# Patient Record
(demographics unavailable — no encounter records)

---

## 2025-05-14 NOTE — HISTORY OF PRESENT ILLNESS
[Stable] : stable [___ mths] : [unfilled] month(s) ago [1] : currently ~his/her~ pain is 1 out of 10 [Intermit.] : ~He/She~ states the symptoms seem to be intermittent [Running] : worsened by running [Acetaminophen] : relieved by acetaminophen [Recumbency] : relieved by recumbency [Rest] : relieved by rest [FreeTextEntry1] : Patient is a 14 yo F with no significant pmhx who is presenting today for a few months of bilateral anterior knee pain. She is very active and does gymnastics and swimming. After gymnastics practice she notes that she has a few minutes of anterior knee pain. The knee pain usually resolves by itself or gets better with Tylenol. It is usually mild in nature and only happens "once in a while" when she is very active. Has not prevented her from doing gymnastics or other physical activity. Denies changes in gait. Denies numbness or tingling. Denies fever, chills, SOB.  The HPI was reviewed with patient and parent. The patient's parent has acted as an independent historian regarding the above information due to unreliable nature of the history obtained from the patient.

## 2025-05-14 NOTE — PHYSICAL EXAM
[FreeTextEntry1] : Patient is AAOx3. Pleasant and cooperative with exam, appropriate for age. NAD and alert.  Skin: The skin is intact, warm, pink and dry over the area examined. No rashes, lesions, or nodules. Eyes: Normal conjunctiva, normal eyelids and pupils were equal and round.  ENT: Normal ears, normal nose, and normal lips. Cardiovascular: Brisk capillary refill. Peripheral pulses intact. No peripheral edema. Respiratory: NAD. Taking full breaths without use of accessory muscles or evidence of audible wheezes or stridor without the use of a stethoscope. Normal respiratory effort.  Abdomen: Not examined.  Lower extremity exam: No gross deformity No point tenderness or pain to palpation FROM of knees with no pain on ROM  Negative anterior and posterior drawer. No pain on varus and valgus stress.  Hip ROM full and symmetric 5/5 motor strength with knee and hip flexion/extension Moving all digits freely EHL/FHL/TA/GS intact BCR in all digits +2 DP pulse Sensation grossly intact No lymphedema No evidence of LLD No evidence of malrotation

## 2025-05-14 NOTE — BIRTH HISTORY
[Non-Contributory] : Non-contributory [Duration: ___ wks] : duration: [unfilled] weeks [Vaginal] : Vaginal [Normal?] : normal delivery [___ lbs.] : [unfilled] lbs [___ oz.] : [unfilled] oz. [Was child in NICU?] : Child was in NICU [FreeTextEntry7] : branchial cyst surgery

## 2025-05-14 NOTE — ASSESSMENT
[FreeTextEntry1] : Mild patellar tendinitis, bilateral  The history for today's visit was obtained from the child as well as the parent. The child's history was unreliable alone due to age and therefore, the parent was used today as an independent historian. No imaging was obtained today.  Clinically, the patient notes that the tenderness is located at the inferior border of the patella over the patellar tendon. She denies any pain today and has no pain on exam. The pain occurs for a few minutes after long practices or days she is on her knees for extended periods of time in gymnastics class. States that her pain has not kept her from any activity. Tylenol and ice usually help with her pain. She likely has mild patellar tendinitis due to her activity. Diagnosis was discussed at length with the patient and family. The etiology, pathoanatomy, treatment modalities, and expected natural history of the injury were discussed at length today. Recommendation at this time is to use ice and pain medications as needed. Can use an OTC patellar strap to help ease her pain as well. Will follow up as needed for any new or worsening symptoms. All concerns were addressed. Family agree to plan and have no further questions.   I, Cesilia Doe PA-C, have acted as scribe and documented the above for Dr. Perez for this encounter.  The above documentation completed by the PA is an accurate record of both my words and actions. Levi Perez MD.  This note was generated using Dragon medical dictation software.  A reasonable effort has been made for proofreading its contents, but typos may still remain.  If there are any questions or points of clarification needed please do not hesitate to contact my office.